# Patient Record
Sex: FEMALE | Race: WHITE | NOT HISPANIC OR LATINO | Employment: OTHER | URBAN - METROPOLITAN AREA
[De-identification: names, ages, dates, MRNs, and addresses within clinical notes are randomized per-mention and may not be internally consistent; named-entity substitution may affect disease eponyms.]

---

## 2017-08-03 ENCOUNTER — HOSPITAL ENCOUNTER (OUTPATIENT)
Dept: CT IMAGING | Facility: CLINIC | Age: 82
Discharge: HOME | End: 2017-08-03
Attending: PREVENTIVE MEDICINE | Admitting: PREVENTIVE MEDICINE
Payer: COMMERCIAL

## 2017-08-03 DIAGNOSIS — Z00.6 RESEARCH EXAM: ICD-10-CM

## 2017-08-03 PROCEDURE — 71250 CT THORAX DX C-: CPT | Mod: TC

## 2017-08-22 ENCOUNTER — TELEPHONE (OUTPATIENT)
Dept: AUDIOLOGY | Facility: CLINIC | Age: 82
End: 2017-08-22

## 2017-08-22 NOTE — TELEPHONE ENCOUNTER
Moira Zaragoza was seen at the [facility] on 8/22/17 for hearing aid services.  Her earmold tubing had broken and needed to be replaced.  This was done today without issue.  Dai reported a comfortable fit from the earmolds with the new tubes in place.  She indicated she may schedule a hearing test soon as she feels the hearing in her left ear may have declined.  Otherwise, she will return to the clinic as needed.

## 2021-02-05 ENCOUNTER — OFFICE VISIT (OUTPATIENT)
Dept: AUDIOLOGY | Facility: CLINIC | Age: 86
End: 2021-02-05

## 2021-02-05 DIAGNOSIS — H90.6 MIXED HEARING LOSS, BILATERAL: Primary | ICD-10-CM

## 2021-02-05 NOTE — PROGRESS NOTES
AUDIOLOGY REPORT    SUBJECTIVE:  Moira Zaragoza is a 85 year old female who was seen in the Audiology Clinic at the [facility] on 2/5/2021 for a hearing aid check. 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  The patient has been seen previously in this clinic and was fit with a binaural Siemens Artis2 SP on 2/4/2008 and a left Siemens Nitro 301 BTE hearing aid on 6/20/12.  Virginia reports she is currently only wearing the right Artis2 SP because she does not see any benefit from the left. She also reports the right hearing aid is broken and is feeding back constantly.    OBJECTIVE:   Otoscopy revealed white debris deep in the right ear canal near the eardrum.    The hearing aid was cleaned and checked. The earmold tubing was extremely brittle and had broken off in the earmold. I changed the earmold tubing and tone hook.    We discussed the debris in her ear and that I think she should be evaluated by an ENT. In addition we discussed that it has been 6 years since her last hearing test so we should update that and should probably start discussing a new hearing aid for her given the age of her current one. She is in agreement with this plan.      ASSESSMENT:   An out of warranty hearing aid clean and check was completed today. Changes to hearing aid was completed as outlined above.     PLAN:   Virginia will return for an ear check with ENT followed by a hearing aid evaluation. Please call this clinic with any questions regarding today s appointment.    Jaamal Landry, [facility]  Licensed Audiologist  [place] License #[id number]

## 2021-02-08 NOTE — TELEPHONE ENCOUNTER
FUTURE VISIT INFORMATION      FUTURE VISIT INFORMATION:    Date: 3/19/21    Time: 8:30 AM    Location: [facility]  REFERRAL INFORMATION:    Referring provider:  Jake Landry    Referring providers clinic:  [facility] - Audiology    Reason for visit/diagnosis: Ear Cleaning    RECORDS REQUESTED FROM:       Clinic name Comments Records Status Imaging Status   [facility] 2/5/21 - AUD OV with Jake Landry  8/4/11 - ENT OV with Dr. Sander Meza    [facility] - Procedure 5/28/15 - Audiogram Epic

## 2021-03-09 NOTE — PATIENT INSTRUCTIONS
1. You were seen in the ENT Clinic today by Dr. Huffman.  If you have any questions or concerns after your appointment, please call   - Option 1: ENT Clinic: [phone]   - Option 2: Laurence (Dr. Huffman's Nurse): [phone]    2.   Plan to return to clinic in 2 weeks with hearing test     3. Tobradex- both ears- 2-3 drops twice daily x 14 days    4. Augmentin- 1 tablet three times daily x 10 days    5. Ear cultures- will call with results and may need to adjust medications.    Laurence Griggs, LPN  [facility] - Otolaryngology    The patient presents with a history of severe sensorineural hearing loss and an acute otitis externa. Her ears are cleaned and cultured today. She will be treated with Augmentin and Tobradex Otic drops for two weeks and she will be seen again in two weeks to assess her progress and to review an Audiogram and Tympanogram.

## 2021-03-19 ENCOUNTER — OFFICE VISIT (OUTPATIENT)
Dept: OTOLARYNGOLOGY | Facility: CLINIC | Age: 86
End: 2021-03-19
Payer: COMMERCIAL

## 2021-03-19 ENCOUNTER — TELEPHONE (OUTPATIENT)
Dept: OTOLARYNGOLOGY | Facility: CLINIC | Age: 86
End: 2021-03-19

## 2021-03-19 ENCOUNTER — PRE VISIT (OUTPATIENT)
Dept: OTOLARYNGOLOGY | Facility: CLINIC | Age: 86
End: 2021-03-19

## 2021-03-19 VITALS
RESPIRATION RATE: 22 BRPM | DIASTOLIC BLOOD PRESSURE: 105 MMHG | OXYGEN SATURATION: 97 % | BODY MASS INDEX: 31.79 KG/M2 | HEART RATE: 109 BPM | WEIGHT: 185.19 LBS | TEMPERATURE: 97.5 F | SYSTOLIC BLOOD PRESSURE: 158 MMHG

## 2021-03-19 DIAGNOSIS — H60.503 ACUTE OTITIS EXTERNA OF BOTH EARS, UNSPECIFIED TYPE: Primary | ICD-10-CM

## 2021-03-19 DIAGNOSIS — H90.3 SENSORINEURAL HEARING LOSS (SNHL) OF BOTH EARS: ICD-10-CM

## 2021-03-19 DIAGNOSIS — H92.13 OTORRHEA, BILATERAL: ICD-10-CM

## 2021-03-19 PROCEDURE — 87102 FUNGUS ISOLATION CULTURE: CPT | Mod: 90 | Performed by: PATHOLOGY

## 2021-03-19 PROCEDURE — 87077 CULTURE AEROBIC IDENTIFY: CPT | Mod: 90 | Performed by: PATHOLOGY

## 2021-03-19 PROCEDURE — 87186 SC STD MICRODIL/AGAR DIL: CPT | Mod: 90 | Performed by: PATHOLOGY

## 2021-03-19 PROCEDURE — 92504 EAR MICROSCOPY EXAMINATION: CPT | Performed by: OTOLARYNGOLOGY

## 2021-03-19 PROCEDURE — 99203 OFFICE O/P NEW LOW 30 MIN: CPT | Mod: 25 | Performed by: OTOLARYNGOLOGY

## 2021-03-19 PROCEDURE — 99000 SPECIMEN HANDLING OFFICE-LAB: CPT | Performed by: PATHOLOGY

## 2021-03-19 PROCEDURE — 87070 CULTURE OTHR SPECIMN AEROBIC: CPT | Mod: 90 | Performed by: PATHOLOGY

## 2021-03-19 PROCEDURE — 87106 FUNGI IDENTIFICATION YEAST: CPT | Mod: 90 | Performed by: PATHOLOGY

## 2021-03-19 RX ORDER — TOBRAMYCIN AND DEXAMETHASONE 3; 1 MG/ML; MG/ML
2-3 SUSPENSION/ DROPS OPHTHALMIC 2 TIMES DAILY
Qty: 5 ML | Refills: 1 | Status: SHIPPED | OUTPATIENT
Start: 2021-03-19 | End: 2021-04-02

## 2021-03-19 RX ORDER — AMOXICILLIN AND CLAVULANATE POTASSIUM 250; 125 MG/1; MG/1
1 TABLET, FILM COATED ORAL 3 TIMES DAILY
Qty: 30 TABLET | Refills: 0 | Status: SHIPPED | OUTPATIENT
Start: 2021-03-19 | End: 2021-03-29

## 2021-03-19 ASSESSMENT — PAIN SCALES - GENERAL: PAINLEVEL: NO PAIN (0)

## 2021-03-19 NOTE — LETTER
3/19/2021        RE: Moira Zaragoza  [address]  [facility] [address]     Dear Colleague,    Thank you for referring your patient, Moira Zaragoza, to the [facility] EAR NOSE AND THROAT CLINIC [place] at [facility]. Please see a copy of my visit note below.    The patient presents with a history of cerumen impaction in the ears. She reports otorrhea from the right ear. She has severe sensorineural hearing loss in both ears. The patient denies sinusitis, rhinitis, facial pain, nasal obstruction or purulent nasal discharge. The patient denies chronic or recurrent tonsillitis, chronic or recurrent pharyngitis. The patient denies dizziness.    This patient is seen in consultation at the request of Dr. Devang Coleman.     All other systems were reviewed and they are either negative or they are not directly pertinent to this Otolaryngology examination.      Past Medical History:    Past Medical History:   Diagnosis Date     Hypertension      Hypothyroid      Pulmonary emboli (H)        Past Surgical History:    No past surgical history on file.    Medications:      Current Outpatient Medications:      aspirin 81 MG tablet, Take 1 tablet by mouth daily., Disp: , Rfl:      fish oil-omega-3 fatty acids (FISH OIL) 1000 MG capsule, Take 1 g by mouth daily., Disp: , Rfl:      fluconazole (DIFLUCAN) 150 MG tablet, Take 1 tablet by mouth every 7 days., Disp: 4 tablet, Rfl: 0     levothyroxine (SYNTHROID, LEVOTHROID) 137 MCG tablet, One daily by mouth, Disp: 90 tablet, Rfl: 3     Multiple Vitamins-Iron (MULTIVITAMIN/IRON PO), Take  by mouth 2 times daily., Disp: , Rfl:      ORDER FOR DME, LOCATION:  Home Med   CPAP:   CPAP 9 cm H2O Flex 3 Lifetime need and humidity      , Disp: 1 Device, Rfl: 0     triamterene-hydrochlorothiazide (MAXZIDE-25) 37.5-25 MG per tablet, One po daily, Disp: 90 tablet, Rfl: 3    Allergies:    Amlodipine, Ocuvite-lutein,  Strawberry, Alendronic acid, Lisinopril, and Risedronate    Physical Examination:    The patient is a well developed, well nourished female in no apparent distress.  She is normocephalic, atraumatic with pupils equally round and reactive to light.    Oral Cavity Examination:  Normal mucosa with no masses or lesions  Nasal Examination: Normal mucosa with no masses or lesions  Ear Examination: Ear canals have a purulent otorrhea in both ears canals with the right ear worse than in the left ear.  The ear canals are suction using a binocular microscope for visualization after a culture is collected from the right ear canal. The tympanic membranes and middle ear spaces normal bilaterally.  Neurological Examination: Facial nerve function intact and symmetric  Integumentary Examination: No lesions on the skin of the head and neck  Neck Examination: No masses or lesions, no lymphadenopathy  Endocrine Examination: Normal thyroid examination    Assessment and Plan:    The patient presents with a history of severe sensorineural hearing loss and an acute otitis externa. Her ears are cleaned and cultured today. She will be treated with Augmentin and Tobradex Otic drops for two weeks and she will be seen again in two weeks to assess her progress and to review an Audiogram and Tympanogram.     CC: Dr. Devang Coleman        Again, thank you for allowing me to participate in the care of your patient.      Sincerely,    Cecil Huffman MD

## 2021-03-19 NOTE — TELEPHONE ENCOUNTER
Verified that the script was sent. They have to order the medication in. They will call the patient.  Laurence Griggs LPN

## 2021-03-19 NOTE — PROGRESS NOTES
The patient presents with a history of cerumen impaction in the ears. She reports otorrhea from the right ear. She has severe sensorineural hearing loss in both ears. The patient denies sinusitis, rhinitis, facial pain, nasal obstruction or purulent nasal discharge. The patient denies chronic or recurrent tonsillitis, chronic or recurrent pharyngitis. The patient denies dizziness.    This patient is seen in consultation at the request of Dr. Devang Coleman.     All other systems were reviewed and they are either negative or they are not directly pertinent to this Otolaryngology examination.      Past Medical History:    Past Medical History:   Diagnosis Date     Hypertension      Hypothyroid      Pulmonary emboli (H)        Past Surgical History:    No past surgical history on file.    Medications:      Current Outpatient Medications:      aspirin 81 MG tablet, Take 1 tablet by mouth daily., Disp: , Rfl:      fish oil-omega-3 fatty acids (FISH OIL) 1000 MG capsule, Take 1 g by mouth daily., Disp: , Rfl:      fluconazole (DIFLUCAN) 150 MG tablet, Take 1 tablet by mouth every 7 days., Disp: 4 tablet, Rfl: 0     levothyroxine (SYNTHROID, LEVOTHROID) 137 MCG tablet, One daily by mouth, Disp: 90 tablet, Rfl: 3     Multiple Vitamins-Iron (MULTIVITAMIN/IRON PO), Take  by mouth 2 times daily., Disp: , Rfl:      ORDER FOR DME, LOCATION: [facility]   CPAP:   CPAP 9 cm H2O Flex 3 Lifetime need and humidity      , Disp: 1 Device, Rfl: 0     triamterene-hydrochlorothiazide (MAXZIDE-25) 37.5-25 MG per tablet, One po daily, Disp: 90 tablet, Rfl: 3    Allergies:    Amlodipine, Ocuvite-lutein, Strawberry, Alendronic acid, Lisinopril, and Risedronate    Physical Examination:    The patient is a well developed, well nourished female in no apparent distress.  She is normocephalic, atraumatic with pupils equally round and reactive to light.    Oral Cavity Examination:  Normal mucosa with no masses or lesions  Nasal Examination: Normal  mucosa with no masses or lesions  Ear Examination: Ear canals have a purulent otorrhea in both ears canals with the right ear worse than in the left ear.  The ear canals are suction using a binocular microscope for visualization after a culture is collected from the right ear canal. The tympanic membranes and middle ear spaces normal bilaterally.  Neurological Examination: Facial nerve function intact and symmetric  Integumentary Examination: No lesions on the skin of the head and neck  Neck Examination: No masses or lesions, no lymphadenopathy  Endocrine Examination: Normal thyroid examination    Assessment and Plan:    The patient presents with a history of severe sensorineural hearing loss and an acute otitis externa. Her ears are cleaned and cultured today. She will be treated with Augmentin and Tobradex Otic drops for two weeks and she will be seen again in two weeks to assess her progress and to review an Audiogram and Tympanogram.     CC: Dr. Devang Coleman

## 2021-03-19 NOTE — NURSING NOTE
Chief Complaint   Patient presents with     Consult     ear cleaning      Blood pressure (!) 158/105, pulse 109, temperature 97.5  F (36.4  C), resp. rate 22, weight 84 kg (185 lb 3 oz), SpO2 97 %.    Delvis Rahman LPN

## 2021-03-19 NOTE — TELEPHONE ENCOUNTER
[facility] Call Center    Phone Message    May a detailed message be left on voicemail: yes     Reason for Call: Medication Question or concern regarding medication   Prescription Clarification  Name of Medication: tobradex, and augmentin  Prescribing Provider: khushbu   Pharmacy: [facility] [address] [place] [address].   What on the order needs clarification? Pt states that she was at her pharmacy a couple hrs ago and they did have not received her rx. Please follow-up with pharmacy ASAP.     Action Taken: Other:  ent    Travel Screening: NA

## 2021-03-24 LAB
BACTERIA SPEC CULT: ABNORMAL
SPECIMEN SOURCE: ABNORMAL

## 2021-03-29 ENCOUNTER — TELEPHONE (OUTPATIENT)
Dept: OTOLARYNGOLOGY | Facility: CLINIC | Age: 86
End: 2021-03-29

## 2021-04-01 ENCOUNTER — TELEPHONE (OUTPATIENT)
Dept: OTOLARYNGOLOGY | Facility: CLINIC | Age: 86
End: 2021-04-01

## 2021-04-02 ENCOUNTER — OFFICE VISIT (OUTPATIENT)
Dept: OTOLARYNGOLOGY | Facility: CLINIC | Age: 86
End: 2021-04-02
Payer: COMMERCIAL

## 2021-04-02 ENCOUNTER — OFFICE VISIT (OUTPATIENT)
Dept: AUDIOLOGY | Facility: CLINIC | Age: 86
End: 2021-04-02
Attending: OTOLARYNGOLOGY
Payer: COMMERCIAL

## 2021-04-02 VITALS
HEIGHT: 64 IN | HEART RATE: 91 BPM | TEMPERATURE: 96.9 F | WEIGHT: 184.3 LBS | SYSTOLIC BLOOD PRESSURE: 155 MMHG | BODY MASS INDEX: 31.47 KG/M2 | DIASTOLIC BLOOD PRESSURE: 79 MMHG | OXYGEN SATURATION: 96 %

## 2021-04-02 DIAGNOSIS — H60.503 ACUTE OTITIS EXTERNA OF BOTH EARS, UNSPECIFIED TYPE: Primary | ICD-10-CM

## 2021-04-02 DIAGNOSIS — H90.6 MIXED HEARING LOSS, BILATERAL: Primary | ICD-10-CM

## 2021-04-02 DIAGNOSIS — H92.13 OTORRHEA, BILATERAL: ICD-10-CM

## 2021-04-02 DIAGNOSIS — H90.3 SENSORINEURAL HEARING LOSS (SNHL) OF BOTH EARS: ICD-10-CM

## 2021-04-02 PROCEDURE — 92557 COMPREHENSIVE HEARING TEST: CPT | Performed by: AUDIOLOGIST

## 2021-04-02 PROCEDURE — 92550 TYMPANOMETRY & REFLEX THRESH: CPT | Mod: 52 | Performed by: AUDIOLOGIST

## 2021-04-02 PROCEDURE — 99213 OFFICE O/P EST LOW 20 MIN: CPT | Performed by: OTOLARYNGOLOGY

## 2021-04-02 RX ORDER — LOSARTAN POTASSIUM 25 MG/1
TABLET ORAL
COMMUNITY
Start: 2021-03-22

## 2021-04-02 RX ORDER — TOBRAMYCIN AND DEXAMETHASONE 3; 1 MG/ML; MG/ML
2-3 SUSPENSION/ DROPS OPHTHALMIC 2 TIMES DAILY
Qty: 5 ML | Refills: 1 | Status: SHIPPED | OUTPATIENT
Start: 2021-04-02 | End: 2021-05-06

## 2021-04-02 RX ORDER — AMOXICILLIN AND CLAVULANATE POTASSIUM 250; 125 MG/1; MG/1
1 TABLET, FILM COATED ORAL
COMMUNITY
Start: 2021-03-22 | End: 2021-05-06

## 2021-04-02 ASSESSMENT — MIFFLIN-ST. JEOR: SCORE: 1261

## 2021-04-02 ASSESSMENT — PAIN SCALES - GENERAL: PAINLEVEL: NO PAIN (0)

## 2021-04-02 NOTE — PATIENT INSTRUCTIONS
1. You were seen in the ENT Clinic today by Dr. Huffman.  If you have any questions or concerns after your appointment, please call   - Option 1: ENT Clinic: [phone]  - Option 2: Brinda (Dr. Huffman's Nurse): [phone]    2.   CT scan of temporal bone.    3.  Refill tobradex.    4.  Plan to return to clinic after imaging.     The patient presents with a history of severe sensorineural hearing loss and an acute otitis externa. She was treated with Augmentin and Tobradex Otic and she responded to this therapy. She will proceed with her Audiogram and Tympanogram and she will be seen again as needed. She will also be referred for a CT scan of the temporal bones to assess her middle ear and mastoid. She will be seen again after this is completed.     Brinda Khan RN  Clinical Coordinator  [facility] Otolaryngology  [phone]

## 2021-04-02 NOTE — NURSING NOTE
"Chief Complaint   Patient presents with     RECHECK     2 week follow up and WIN after       Blood pressure (!) 155/79, pulse 91, temperature 96.9  F (36.1  C), temperature source Temporal, height 1.626 m (5' 4\"), weight 83.6 kg (184 lb 4.9 oz), SpO2 96 %.    Malika Schofield, EMT    "

## 2021-04-02 NOTE — PROGRESS NOTES
The patient presents with a history of otitis externa. She was treated with Augmentin and Tobradex Otic. She reports improvement of her infection with this therapy. She denies otalgia or otorrhea. She has severe sensorineural hearing loss in both ears.          All other systems were reviewed and they are either negative or they are not directly pertinent to this Otolaryngology examination.      Past Medical History:    Past Medical History:   Diagnosis Date     Hypertension      Hypothyroid      Pulmonary emboli (H)        Past Surgical History:    No past surgical history on file.    Medications:      Current Outpatient Medications:      Docosahexaenoic Acid (DHA) 200 MG capsule, Take 200 mg by mouth daily, Disp: , Rfl:      levothyroxine (SYNTHROID, LEVOTHROID) 137 MCG tablet, One daily by mouth, Disp: 90 tablet, Rfl: 3     amoxicillin-clavulanate (AUGMENTIN) 250-125 MG tablet, Take 1 tablet by mouth, Disp: , Rfl:      aspirin 81 MG tablet, Take 1 tablet by mouth daily., Disp: , Rfl:      fish oil-omega-3 fatty acids (FISH OIL) 1000 MG capsule, Take 1 g by mouth daily., Disp: , Rfl:      fluconazole (DIFLUCAN) 150 MG tablet, Take 1 tablet by mouth every 7 days., Disp: 4 tablet, Rfl: 0     Multiple Vitamins-Iron (MULTIVITAMIN/IRON PO), Take  by mouth 2 times daily., Disp: , Rfl:      ORDER FOR DME, LOCATION: [facility]   CPAP:   CPAP 9 cm H2O Flex 3 Lifetime need and humidity      , Disp: 1 Device, Rfl: 0     tobramycin-dexamethasone (TOBRADEX) 0.3-0.1 % ophthalmic suspension, Place 2-3 drops into both ears 2 times daily for 14 days, Disp: 5 mL, Rfl: 1     triamterene-hydrochlorothiazide (MAXZIDE-25) 37.5-25 MG per tablet, One po daily, Disp: 90 tablet, Rfl: 3    Allergies:    Amlodipine, Ocuvite-lutein, Strawberry, Alendronic acid, Lisinopril, and Risedronate    Physical Examination:    The patient is a well developed, well nourished female in no apparent distress.  She is normocephalic, atraumatic with pupils  equally round and reactive to light.    Oral Cavity Examination:  Normal mucosa with no masses or lesions  Nasal Examination: Normal mucosa with no masses or lesions  Ear Examination: Ear canals are clear. The tympanic membrane and middle ear space on the left ear normal. On the right, he tympanic membrane is somewhat opaque and it is difficulty to assess the middle ear space.  Neurological Examination: Facial nerve function intact and symmetric  Integumentary Examination: No lesions on the skin of the head and neck    Assessment and Plan:    The patient presents with a history of severe sensorineural hearing loss and an acute otitis externa. She was treated with Augmentin and Tobradex Otic and she responded to this therapy. She will proceed with her Audiogram and Tympanogram and she will be seen again as needed. She will also be referred for a CT scan of the temporal bones to assess her middle ear and mastoid. She will be seen again after this is completed.     CC: Dr. Devang Coleman

## 2021-04-02 NOTE — LETTER
4/2/2021       RE: Moira Zaragoza  [address]  [facility] [address]     Dear Colleague,    Thank you for referring your patient, Moira Zaragoza, to the [facility] EAR NOSE AND THROAT CLINIC [place] at [facility]. Please see a copy of my visit note below.    The patient presents with a history of otitis externa. She was treated with Augmentin and Tobradex Otic. She reports improvement of her infection with this therapy. She denies otalgia or otorrhea. She has severe sensorineural hearing loss in both ears.          All other systems were reviewed and they are either negative or they are not directly pertinent to this Otolaryngology examination.      Past Medical History:    Past Medical History:   Diagnosis Date     Hypertension      Hypothyroid      Pulmonary emboli (H)        Past Surgical History:    No past surgical history on file.    Medications:      Current Outpatient Medications:      Docosahexaenoic Acid (DHA) 200 MG capsule, Take 200 mg by mouth daily, Disp: , Rfl:      levothyroxine (SYNTHROID, LEVOTHROID) 137 MCG tablet, One daily by mouth, Disp: 90 tablet, Rfl: 3     amoxicillin-clavulanate (AUGMENTIN) 250-125 MG tablet, Take 1 tablet by mouth, Disp: , Rfl:      aspirin 81 MG tablet, Take 1 tablet by mouth daily., Disp: , Rfl:      fish oil-omega-3 fatty acids (FISH OIL) 1000 MG capsule, Take 1 g by mouth daily., Disp: , Rfl:      fluconazole (DIFLUCAN) 150 MG tablet, Take 1 tablet by mouth every 7 days., Disp: 4 tablet, Rfl: 0     Multiple Vitamins-Iron (MULTIVITAMIN/IRON PO), Take  by mouth 2 times daily., Disp: , Rfl:      ORDER FOR DME, LOCATION:  Home Med   CPAP:   CPAP 9 cm H2O Flex 3 Lifetime need and humidity      , Disp: 1 Device, Rfl: 0     tobramycin-dexamethasone (TOBRADEX) 0.3-0.1 % ophthalmic suspension, Place 2-3 drops into both ears 2 times daily for 14 days, Disp: 5 mL, Rfl: 1     triamterene-hydrochlorothiazide  (MAXZIDE-25) 37.5-25 MG per tablet, One po daily, Disp: 90 tablet, Rfl: 3    Allergies:    Amlodipine, Ocuvite-lutein, Strawberry, Alendronic acid, Lisinopril, and Risedronate    Physical Examination:    The patient is a well developed, well nourished female in no apparent distress.  She is normocephalic, atraumatic with pupils equally round and reactive to light.    Oral Cavity Examination:  Normal mucosa with no masses or lesions  Nasal Examination: Normal mucosa with no masses or lesions  Ear Examination: Ear canals are clear. The tympanic membrane and middle ear space on the left ear normal. On the right, he tympanic membrane is somewhat opaque and it is difficulty to assess the middle ear space.  Neurological Examination: Facial nerve function intact and symmetric  Integumentary Examination: No lesions on the skin of the head and neck    Assessment and Plan:    The patient presents with a history of severe sensorineural hearing loss and an acute otitis externa. She was treated with Augmentin and Tobradex Otic and she responded to this therapy. She will proceed with her Audiogram and Tympanogram and she will be seen again as needed. She will also be referred for a CT scan of the temporal bones to assess her middle ear and mastoid. She will be seen again after this is completed.     CC: Dr. Devang Coleman        Again, thank you for allowing me to participate in the care of your patient.      Sincerely,    Cecil Huffman MD

## 2021-04-02 NOTE — PROGRESS NOTES
AUDIOLOGY REPORT    SUMMARY: Audiology visit completed. See audiogram for results.      RECOMMENDATIONS: Follow-up with ENT.    Jamaal Landry, [facility]  Licensed Audiologist  [place] License #[id number]

## 2021-04-07 ENCOUNTER — ANCILLARY PROCEDURE (OUTPATIENT)
Dept: CT IMAGING | Facility: CLINIC | Age: 86
End: 2021-04-07
Attending: OTOLARYNGOLOGY
Payer: COMMERCIAL

## 2021-04-07 DIAGNOSIS — H90.3 SENSORINEURAL HEARING LOSS (SNHL) OF BOTH EARS: ICD-10-CM

## 2021-04-07 DIAGNOSIS — H60.503 ACUTE OTITIS EXTERNA OF BOTH EARS, UNSPECIFIED TYPE: ICD-10-CM

## 2021-04-07 PROCEDURE — 70480 CT ORBIT/EAR/FOSSA W/O DYE: CPT | Performed by: RADIOLOGY

## 2021-04-16 LAB
FUNGUS SPEC CULT: ABNORMAL
FUNGUS SPEC CULT: ABNORMAL
SPECIMEN SOURCE: ABNORMAL

## 2021-04-28 NOTE — PATIENT INSTRUCTIONS
1. You were seen in the ENT Clinic today by Dr. Huffman.  If you have any questions or concerns after your appointment, please call   - Option 1: ENT Clinic: [phone]   - Option 2: Laurence (Dr. Huffman's Nurse): [phone]    2.   Plan to return to clinic in 3 months    Laurence Griggs LPN  [facility] - Otolaryngology      The patient presents with a history of severe sensorineural hearing loss and an acute otitis externa. She was treated with Augmentin and Tobradex Otic and she responded to this therapy. Based upon her CT scan and her Audiogram and Tympanogram, she was offered a consultation with our surgical neurotologists, but she declines. She will continue efforts of hearing amplification and she will be seen again in three months.

## 2021-05-06 ENCOUNTER — OFFICE VISIT (OUTPATIENT)
Dept: OTOLARYNGOLOGY | Facility: CLINIC | Age: 86
End: 2021-05-06
Payer: COMMERCIAL

## 2021-05-06 VITALS
TEMPERATURE: 97.6 F | HEART RATE: 101 BPM | WEIGHT: 187.39 LBS | BODY MASS INDEX: 31.99 KG/M2 | OXYGEN SATURATION: 98 % | HEIGHT: 64 IN

## 2021-05-06 DIAGNOSIS — H90.6 MIXED CONDUCTIVE AND SENSORINEURAL HEARING LOSS OF BOTH EARS: Primary | ICD-10-CM

## 2021-05-06 PROCEDURE — 99213 OFFICE O/P EST LOW 20 MIN: CPT | Performed by: OTOLARYNGOLOGY

## 2021-05-06 ASSESSMENT — MIFFLIN-ST. JEOR: SCORE: 1275

## 2021-05-06 ASSESSMENT — PAIN SCALES - GENERAL: PAINLEVEL: NO PAIN (0)

## 2021-05-06 NOTE — LETTER
5/6/2021       RE: Moira Zaragoza  [address]  [facility] [address]     Dear Colleague,    Thank you for referring your patient, Moira Zaragoza, to the [facility] EAR NOSE AND THROAT CLINIC [place] at [facility]. Please see a copy of my visit note below.    The patient presents with a history of otitis externa. She was treated with Augmentin and Tobradex Otic. She reports improvement of her infection with this therapy. She denies otalgia or otorrhea. She has severe sensorineural hearing loss in both ears.    Her recent CT scan demonstrates:     Impression:    Right temporal bone: Partial opacification of the mastoid air cells  and thickening of the tympanic membrane.  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 Thickening of the tympanic  membrane.       All other systems were reviewed and they are either negative or they are not directly pertinent to this Otolaryngology examination.      Past Medical History:    Past Medical History:   Diagnosis Date     Hypertension      Hypothyroid      Pulmonary emboli (H)        Past Surgical History:    No past surgical history on file.    Medications:      Current Outpatient Medications:      aspirin 81 MG tablet, Take 1 tablet by mouth daily., Disp: , Rfl:      Docosahexaenoic Acid (DHA) 200 MG capsule, Take 200 mg by mouth daily, Disp: , Rfl:      fish oil-omega-3 fatty acids (FISH OIL) 1000 MG capsule, Take 1 g by mouth daily., Disp: , Rfl:      levothyroxine (SYNTHROID, LEVOTHROID) 137 MCG tablet, One daily by mouth, Disp: 90 tablet, Rfl: 3     losartan (COZAAR) 25 MG tablet, , Disp: , Rfl:      Multiple Vitamins-Iron (MULTIVITAMIN/IRON PO), Take  by mouth 2 times daily., Disp: , Rfl:      ORDER FOR DME, LOCATION:  Home Med   CPAP:   CPAP 9 cm H2O Flex 3 Lifetime need  and humidity      , Disp: 1 Device, Rfl: 0     triamterene-hydrochlorothiazide (MAXZIDE-25) 37.5-25 MG per tablet, One po daily (Patient not taking: Reported on 5/6/2021), Disp: 90 tablet, Rfl: 3    Allergies:    Amlodipine, Ocuvite-lutein, Strawberry, Alendronic acid, Lisinopril, and Risedronate    Physical Examination:    The patient is a well developed, well nourished female in no apparent distress.  She is normocephalic, atraumatic with pupils equally round and reactive to light.    Oral Cavity Examination:  Normal mucosa with no masses or lesions  Nasal Examination: Normal mucosa with no masses or lesions  Ear Examination: Ear canals are clear. The tympanic membrane and middle ear space on the left ear normal. On the right, he tympanic membrane is somewhat opaque and it is difficulty to assess the middle ear space.  Neurological Examination: Facial nerve function intact and symmetric  Integumentary Examination: No lesions on the skin of the head and neck    Assessment and Plan:    The patient presents with a history of severe sensorineural hearing loss and an acute otitis externa. She was treated with Augmentin and Tobradex Otic and she responded to this therapy. Based upon her CT scan and her Audiogram and Tympanogram, she was offered a consultation with our surgical neurotologists, but she declines. She will continue efforts of hearing amplification and she will be seen again in three months.     CC: Dr. Devang Coleman        Again, thank you for allowing me to participate in the care of your patient.      Sincerely,    Cecil Huffman MD

## 2021-05-06 NOTE — PROGRESS NOTES
The patient presents with a history of otitis externa. She was treated with Augmentin and Tobradex Otic. She reports improvement of her infection with this therapy. She denies otalgia or otorrhea. She has severe sensorineural hearing loss in both ears.    Her recent CT scan demonstrates:     Impression:    Right temporal bone: Partial opacification of the mastoid air cells  and thickening of the tympanic membrane.  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 Thickening of the tympanic  membrane.       All other systems were reviewed and they are either negative or they are not directly pertinent to this Otolaryngology examination.      Past Medical History:    Past Medical History:   Diagnosis Date     Hypertension      Hypothyroid      Pulmonary emboli (H)        Past Surgical History:    No past surgical history on file.    Medications:      Current Outpatient Medications:      aspirin 81 MG tablet, Take 1 tablet by mouth daily., Disp: , Rfl:      Docosahexaenoic Acid (DHA) 200 MG capsule, Take 200 mg by mouth daily, Disp: , Rfl:      fish oil-omega-3 fatty acids (FISH OIL) 1000 MG capsule, Take 1 g by mouth daily., Disp: , Rfl:      levothyroxine (SYNTHROID, LEVOTHROID) 137 MCG tablet, One daily by mouth, Disp: 90 tablet, Rfl: 3     losartan (COZAAR) 25 MG tablet, , Disp: , Rfl:      Multiple Vitamins-Iron (MULTIVITAMIN/IRON PO), Take  by mouth 2 times daily., Disp: , Rfl:      ORDER FOR DME, LOCATION:  Home [facility]   CPAP:   CPAP 9 cm H2O Flex 3 Lifetime need and humidity      , Disp: 1 Device, Rfl: 0     triamterene-hydrochlorothiazide (MAXZIDE-25) 37.5-25 MG per tablet, One po daily (Patient not taking: Reported on 5/6/2021), Disp: 90 tablet, Rfl: 3    Allergies:    Amlodipine, Ocuvite-lutein, Strawberry, Alendronic acid, Lisinopril, and Risedronate    Physical Examination:    The  patient is a well developed, well nourished female in no apparent distress.  She is normocephalic, atraumatic with pupils equally round and reactive to light.    Oral Cavity Examination:  Normal mucosa with no masses or lesions  Nasal Examination: Normal mucosa with no masses or lesions  Ear Examination: Ear canals are clear. The tympanic membrane and middle ear space on the left ear normal. On the right, he tympanic membrane is somewhat opaque and it is difficulty to assess the middle ear space.  Neurological Examination: Facial nerve function intact and symmetric  Integumentary Examination: No lesions on the skin of the head and neck    Assessment and Plan:    The patient presents with a history of severe sensorineural hearing loss and an acute otitis externa. She was treated with Augmentin and Tobradex Otic and she responded to this therapy. Based upon her CT scan and her Audiogram and Tympanogram, she was offered a consultation with our surgical neurotologists, but she declines. She will continue efforts of hearing amplification and she will be seen again in three months.     CC: Dr. Devang Coleman

## 2021-05-06 NOTE — LETTER
5/6/2021      RE: Moira Zaragoza  [address]  [facility] [address]       The patient presents with a history of otitis externa. She was treated with Augmentin and Tobradex Otic. She reports improvement of her infection with this therapy. She denies otalgia or otorrhea. She has severe sensorineural hearing loss in both ears.    Her recent CT scan demonstrates:     Impression:    Right temporal bone: Partial opacification of the mastoid air cells  and thickening of the tympanic membrane.  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 Thickening of the tympanic  membrane.       All other systems were reviewed and they are either negative or they are not directly pertinent to this Otolaryngology examination.      Past Medical History:    Past Medical History:   Diagnosis Date     Hypertension      Hypothyroid      Pulmonary emboli (H)        Past Surgical History:    No past surgical history on file.    Medications:      Current Outpatient Medications:      aspirin 81 MG tablet, Take 1 tablet by mouth daily., Disp: , Rfl:      Docosahexaenoic Acid (DHA) 200 MG capsule, Take 200 mg by mouth daily, Disp: , Rfl:      fish oil-omega-3 fatty acids (FISH OIL) 1000 MG capsule, Take 1 g by mouth daily., Disp: , Rfl:      levothyroxine (SYNTHROID, LEVOTHROID) 137 MCG tablet, One daily by mouth, Disp: 90 tablet, Rfl: 3     losartan (COZAAR) 25 MG tablet, , Disp: , Rfl:      Multiple Vitamins-Iron (MULTIVITAMIN/IRON PO), Take  by mouth 2 times daily., Disp: , Rfl:      ORDER FOR DME, LOCATION:  Home Med   CPAP:   CPAP 9 cm H2O Flex 3 Lifetime need and humidity      , Disp: 1 Device, Rfl: 0     triamterene-hydrochlorothiazide (MAXZIDE-25) 37.5-25 MG per tablet, One po daily (Patient not taking: Reported on 5/6/2021), Disp: 90 tablet, Rfl: 3    Allergies:    Amlodipine, Ocuvite-lutein,  Strawberry, Alendronic acid, Lisinopril, and Risedronate    Physical Examination:    The patient is a well developed, well nourished female in no apparent distress.  She is normocephalic, atraumatic with pupils equally round and reactive to light.    Oral Cavity Examination:  Normal mucosa with no masses or lesions  Nasal Examination: Normal mucosa with no masses or lesions  Ear Examination: Ear canals are clear. The tympanic membrane and middle ear space on the left ear normal. On the right, he tympanic membrane is somewhat opaque and it is difficulty to assess the middle ear space.  Neurological Examination: Facial nerve function intact and symmetric  Integumentary Examination: No lesions on the skin of the head and neck    Assessment and Plan:    The patient presents with a history of severe sensorineural hearing loss and an acute otitis externa. She was treated with Augmentin and Tobradex Otic and she responded to this therapy. Based upon her CT scan and her Audiogram and Tympanogram, she was offered a consultation with our surgical neurotologists, but she declines. She will continue efforts of hearing amplification and she will be seen again in three months.     CC: Dr. Devang Huffman MD

## 2021-05-06 NOTE — NURSING NOTE
"Chief Complaint   Patient presents with     RECHECK     follow up      Pulse 101, temperature 97.6  F (36.4  C), height 1.626 m (5' 4\"), weight 85 kg (187 lb 6.3 oz), SpO2 98 %.    Delvis Rahman LPN    "

## 2021-05-22 ENCOUNTER — HEALTH MAINTENANCE LETTER (OUTPATIENT)
Age: 86
End: 2021-05-22

## 2021-05-24 ENCOUNTER — RECORDS - HEALTHEAST (OUTPATIENT)
Dept: ADMINISTRATIVE | Facility: CLINIC | Age: 86
End: 2021-05-24

## 2021-05-26 ENCOUNTER — OFFICE VISIT (OUTPATIENT)
Dept: AUDIOLOGY | Facility: CLINIC | Age: 86
End: 2021-05-26
Payer: COMMERCIAL

## 2021-05-26 DIAGNOSIS — H90.3 SENSORINEURAL HEARING LOSS (SNHL) OF BOTH EARS: Primary | ICD-10-CM

## 2021-05-26 DIAGNOSIS — H90.6 MIXED HEARING LOSS, BILATERAL: ICD-10-CM

## 2021-05-26 PROCEDURE — 92590 PR HEARING AID EXAM MONAURAL: CPT | Mod: RT | Performed by: AUDIOLOGIST

## 2021-05-26 NOTE — PROGRESS NOTES
"AUDIOLOGY REPORT    SUBJECTIVE: Moira Zaragoza is a 86 year old female was seen in the Audiology Clinic at  [facility] and Surgery [facility] on 5/26/21 to discuss concerns with hearing and functional communication difficulties.  [place]ia has been seen previously on 4/2/21, and results revealed a bilateral mixed hearing loss. Virginia notes difficulty with communication in a variety of listening situations. The patient has been seen previously in this clinic and was fit with a binaural Siemens Artis2 SP on 2/4/2008.  She stopped wearing the left hearing aid due to perceived lack of benefit.      She signed an ABN prior to the appointment.  She has a TruHearing benefit but wanted to proceed with the consultation today.    OBJECTIVE:  Patient is a hearing aid candidate. Patient would like to move forward with a hearing aid evaluation today. Therefore, the patient was presented with different options for amplification to help aid in communication. Discussed styles, levels of technology and monaural vs. binaural fitting.     She reports that she is only interested in a right hearing aid. She is generally in one on one situations, some small groups. She notes she is outside frequently. She has an \"really old\" iPhone and is not interested in connectivity. She likes her current BTE style and would like to stay with disposable batteries.    The hearing aid(s) mutually chosen were:  Right: Signia Motion Pure 3X  COLOR: Dark Champagne, 37  BATTERY SIZE: 675  EARMOLD/TIPS: full shell    Otoscopy revealed drainage down in the ear canal but it was past where the earmold block would be placed. A right earmold was taken without incident.    ASSESSMENT:     Reviewed purchase information and warranty information with patient. The 45 day trial period was explained to patient. The patient was given a copy of the [facility] consumer brochure on purchasing hearing instruments. Patient risk " factors have been provided to the patient in writing prior to the sale of the hearing aid per FDA regulation. The risk factors are also available in the User Instructional Booklet to be presented on the day of the hearing aid fitting. Hearing aids ordered. Hearing aid evaluation completed.    PLAN: Virginia is scheduled to return in 2-3 weeks for a hearing aid fitting and programming. Purchase agreement will be completed on that date. Please contact this clinic with any questions or concerns.      Greg Pena  Audiologist  [place] License  #[id number]

## 2021-06-21 ENCOUNTER — OFFICE VISIT (OUTPATIENT)
Dept: AUDIOLOGY | Facility: CLINIC | Age: 86
End: 2021-06-21

## 2021-06-21 ENCOUNTER — TELEPHONE (OUTPATIENT)
Dept: AUDIOLOGY | Facility: CLINIC | Age: 86
End: 2021-06-21

## 2021-06-21 DIAGNOSIS — H90.6 MIXED HEARING LOSS, BILATERAL: Primary | ICD-10-CM

## 2021-06-21 PROCEDURE — 99207 PR ASSESSMENT FOR HEARING AID: CPT | Performed by: AUDIOLOGIST

## 2021-06-21 NOTE — PROGRESS NOTES
"AUDIOLOGY REPORT    SUBJECTIVE: Moira Zaragoza is a 86 year old female who was seen in the Audiology Clinic at the [facility] and Surgery [facility] for a fitting of a right Signia Motion Pure 3X hearing aid. 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 The patient is a longstanding user of hearing aids.     OBJECTIVE: The hearing aid conformity evaluation was completed.The hearing aids were placed and they provided a good fit. Real-ear-probe-microphone measurements were completed on the [employer] system and were a good match to NAL-NL1 target with soft sounds audible, moderate sounds comfortable, and loud sounds below discomfort. UCLs are verified through maximum power output measures and demonstrate appropriate limiting of loud inputs  Virginia reported that she \"could not hear anything\" with the hearing aids programmed to target gain. Real-ear measurements were performed with her previous hearing aid and it was found to be above target gain. The new hearing aid gain could not be increased as much as she would like due to limits of equipment. She reported that she did not feel safe with the new hearing aid because it was \"too quiet\".  A different hearing aid will be ordered for her to try, the Phonak Mary device was discussed and she was open to trying it.  She did not leave with the new hearing aid today.     ASSESSMENT: An attempt was made to fit a new hearing aid but it was not loud enough for the patient's preference. A different, more powerful hearing aid will be ordered.     PLAN:Virginia will return next week to be fit with the new hearing aid. She was given the option of 6/28 or 7/1 at 11:30am, she will contact the clinic with which date she would like after she finds a ride.  Today's appointment is a no charge visit as she did not leave with the hearing aid.  Please call this clinic with questions regarding today s appointment.    Jemma Pendleton, " AuD  Audiologist  [place] License  #[id number]

## 2021-06-21 NOTE — TELEPHONE ENCOUNTER
[facility] Call Center    Phone Message    May a detailed message be left on voicemail: yes     Reason for Call: Other: Pt's daughter calling on behalf of Pt to confirm that 6/28 appointment at 11:30 as offered in last visit notes will work for them. Please follow up with Pt for further questions.      Action Taken: Message routed to:  [facility] ([facility] Audiology    Travel Screening: Not Applicable

## 2021-06-28 ENCOUNTER — OFFICE VISIT (OUTPATIENT)
Dept: AUDIOLOGY | Facility: CLINIC | Age: 86
End: 2021-06-28

## 2021-06-28 DIAGNOSIS — H90.6 MIXED HEARING LOSS, BILATERAL: Primary | ICD-10-CM

## 2021-06-28 PROCEDURE — V5011 HEARING AID FITTING/CHECKING: HCPCS | Performed by: AUDIOLOGIST

## 2021-06-28 PROCEDURE — V5241 DISPENSING FEE, MONAURAL: HCPCS | Performed by: AUDIOLOGIST

## 2021-06-28 PROCEDURE — V5257 HEARING AID, DIGIT, MON, BTE: HCPCS | Performed by: AUDIOLOGIST

## 2021-06-28 PROCEDURE — V5264 EAR MOLD/INSERT: HCPCS | Performed by: AUDIOLOGIST

## 2021-06-28 PROCEDURE — V5020 CONFORMITY EVALUATION: HCPCS | Performed by: AUDIOLOGIST

## 2021-06-28 NOTE — PROGRESS NOTES
"AUDIOLOGY REPORT    SUBJECTIVE: Moira Zaragoza is a 86 year old female who was seen in the Audiology Clinic at the [facility] and Surgery [facility] for a fitting of a right Phonak Mary P50-UP hearing aid.Previous results have revealed a bilateral mixed hearing loss. The patient is a longstanding user of hearing aids.   She was seen last week in an attempt to fit an Signia Motion hearing aid, however the patient reported that it was \"too soft\" and that she \"could not hear anything\" from it. The hearing aid was near equipment limits and so it was decided to stop the fitting of the Signia hearing aid and try a more powerful device.  Patient signed the Self-pay waiver as she has a TruHearing benefit but has decided to continue with hearing aids at this clinic which is not a TruHearing provider.    OBJECTIVE: The hearing aid conformity evaluation was completed.The hearing aids were placed and they provided a good fit. Real-ear-probe-microphone measurements were completed on the [employer] system and were a good match to NAL-NL1 target with soft sounds audible, moderate sounds comfortable, and loud sounds below discomfort. UCLs are verified through maximum power output measures and demonstrate appropriate limiting of loud inputs. Virginia was oriented to proper hearing aid use, care, cleaning (no water, dry brush), batteries (size: BATTERY SIZE: 675, insertion/removal, toxicity, low-battery signal), aid insertion/removal, user booklet, warranty information, storage cases, and other hearing aid details. The patient confirmed understanding of hearing aid use and care, and showed proper insertion of hearing aid and batteries while in the office today.Virginia reported good volume and sound quality today.   Hearing aids were programmed as follows:  Program 1:AutoSense  Volume control is activated  EAR(S) FIT: Right  HEARING AID MODEL NAME: Phonak Mary P50-UP  HEARING AID STYLE: " "Behind-the-ear  EARMOLDS/TIP: full shell  SERIAL NUMBERS: Right: [id number]  WARRANTY END DATE: 9/20/2023  The hearing aid was paired with her iPhone to try with phone calls. A call was performed in office and she expressed that she would prefer to keep it simple and not stream so the hearing aid was unpaired from the phone.    Patient was counseled regarding using a mask and wearing hearing aids. It was discussed that the patient should be careful when removing the mask and always check the ears to be sure the hearing aids are still in place following mask removal.  It is recommended that the patient strongly consider the use of an \"ear saver\" which anchors the mask behind the neck if using a mask that typically goes behind the ears, or use a mask that ties behind the head. The patient expressed understanding.    ASSESSMENT: A right hearing aid was fit today. Verification measures were performed. Virginia signed the Hearing Aid Purchase Agreement and was given a copy, as well as details on her hearing aids. Patient was counseled that exact out of pocket amounts cannot be determined for hearing aid claims being sent to insurance. Any insurance coverage information presented to the patient is an estimate only, and is not a guarantee of payment. Patient has been advised to check with their own insurance.    PLAN:Virginia expressed that she will be leaving the [facility] next week to go to [place] for a few months. She expressed understanding that a initial review appointment is strongly encourage, but because she is an experienced hearing aid user, she does not think she will need one. The next appointment was cancelled per her request, she expressed understanding that if she does not like the hearing aid and decides to return it, she would need to return it to the clinic by 8/12/2021, this was written down on her purchase agreement. Please call this clinic with questions regarding today s appointment.    Jemma " Greg Pendleton  Audiologist  [place] License  #[id number]

## 2021-08-13 ENCOUNTER — OFFICE VISIT (OUTPATIENT)
Dept: AUDIOLOGY | Facility: CLINIC | Age: 86
End: 2021-08-13
Payer: COMMERCIAL

## 2021-08-13 DIAGNOSIS — H90.6 MIXED HEARING LOSS, BILATERAL: Primary | ICD-10-CM

## 2021-08-13 PROCEDURE — 99207 PR ASSESSMENT FOR HEARING AID: CPT | Performed by: AUDIOLOGIST

## 2021-08-13 NOTE — PROGRESS NOTES
"AUDIOLOGY REPORT    SUBJECTIVE:Moira Zaragoza is a 86 year old female who was seen in the Audiology Clinic at the [facility] on 8/13/2021  for a follow-up check regarding the fitting of new hearing aids. Virginia has been seen previously in this clinic and was fit with right Phonak Mary P50-UP hearing aid.Previous results have revealed a bilateral mixed hearing loss. The patient is a longstanding user of hearing aids. Virginia reports that she feels sounds are loud enough, however they are not as \"clear\" as her last hearing aids. She also feels she is getting a slight echo to the sound.    OBJECTIVE:   Based on patient report, the following changes were made;Tried turning off Sound Recover and patient report she thinks sound is clearer. Reviewed in detail several times how to change the volume of the hearing aids. Discussed that I want her to try the changes made, and use of the volume control before we make any other changes today. .    Reviewed 45 day trial period, care, cleaning (no water, dry brush), batteries (size 675) insertion/removal, toxicity, low-battery signal), aid insertion/removal, volume adjustment (if applicable), user booklet, warranty information, storage cases, and other hearing aid details. Patient is clearly asked if she wants to keep hearing aid today as we are near the end of the trial period,and she confirmed she did want to keep the hearing aid today.    No charge visit today (in warranty hearing aid check).    ASSESSMENT: A follow-up appointment for hearing aid fitting was completed today. IOI-HA should be administered at next appointment. Changes to hearing aid was completed as outlined above.     PLAN:Virginia will return for follow-up in 2-4 weeks to see if the changes made are beneficial. Please call this clinic with any questions regarding today s appointment.        Aung Sher, [facility]  Licensed Audiologist  [place] #[id number]        "

## 2021-09-11 ENCOUNTER — HEALTH MAINTENANCE LETTER (OUTPATIENT)
Age: 86
End: 2021-09-11

## 2021-09-22 ENCOUNTER — OFFICE VISIT (OUTPATIENT)
Dept: AUDIOLOGY | Facility: CLINIC | Age: 86
End: 2021-09-22
Payer: COMMERCIAL

## 2021-09-22 DIAGNOSIS — H90.6 MIXED HEARING LOSS, BILATERAL: Primary | ICD-10-CM

## 2021-09-22 PROCEDURE — 99207 PR ASSESSMENT FOR HEARING AID: CPT | Performed by: AUDIOLOGIST

## 2021-09-22 NOTE — PROGRESS NOTES
AUDIOLOGY REPORT    SUBJECTIVE:Moira Zaragoza is a 86 year old female who was seen in the Audiology Clinic at the [facility] on 9/22/2021  for a follow-up check regarding the fitting of new hearing aids. 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 The patient has been seen previously in this clinic and was fit with a right Phonak Mary P50-UP on 6/28/2021.  Virginia reports she is having trouble understanding speech. She feels speech is still unclear and finds herself turning her hearing aids up to the maximum volume daily.     OBJECTIVE:   Based on patient report, the following changes were made; Sound recover was turned back on starting at 4 kHz and she reported no echo with it activated again. Real ear measures were performed and the gain was increased by ~6 dB from 2-3 kHz to better match target gain and gain was decreased 6 dB from 250-500 Hz (still over target gain which is consistent with her previous hearing aid). Virginia reported good sound quality. Volume changes using the buttons was reviewed in detail with [place].     Reviewed 45 day trial period, care, cleaning (no water, dry brush), batteries (size 675) insertion/removal, toxicity, low-battery signal), aid insertion/removal, volume adjustment (if applicable), user booklet, warranty information, storage cases, and other hearing aid details.     No charge visit today (in warranty hearing aid check).     ASSESSMENT: A follow-up appointment for hearing aid fitting was completed today. Changes to hearing aid was completed as outlined above.     PLAN:Virginia will return for follow-up as needed, or at least every 9-12 months for cleaning and assessment of hearing aid.  . Please call this clinic with any questions regarding today s appointment.    Sharon Elena M.A.   Audiology Doctoral Student #[id number]    I was present with the patient for the entire Audiology appointment  including all procedures/testing performed by the AuD student, and agree with the student s assessment and plan as documented.      Jemma Pendleton, Greg  Audiologist  [place] License  #[id number]

## 2022-02-24 ENCOUNTER — TELEPHONE (OUTPATIENT)
Dept: AUDIOLOGY | Facility: CLINIC | Age: 87
End: 2022-02-24
Payer: COMMERCIAL

## 2022-02-24 NOTE — TELEPHONE ENCOUNTER
Walk-in hearing aid services on 2/24/22: The patient asked to have her right hearing aid cleaned and checked.  The hearing aid and earmold were cleaned and the earmold tubing was replaced.  The hearing aid was found to be working normally.  It was returned to [place] who reported a comfortable fit and good sound quality.  She was advised to return to the clinic as needed.

## 2022-03-24 ENCOUNTER — OFFICE VISIT (OUTPATIENT)
Dept: AUDIOLOGY | Facility: CLINIC | Age: 87
End: 2022-03-24
Payer: COMMERCIAL

## 2022-03-24 ENCOUNTER — OFFICE VISIT (OUTPATIENT)
Dept: OTOLARYNGOLOGY | Facility: CLINIC | Age: 87
End: 2022-03-24
Payer: COMMERCIAL

## 2022-03-24 VITALS
TEMPERATURE: 97.1 F | WEIGHT: 185 LBS | HEART RATE: 92 BPM | SYSTOLIC BLOOD PRESSURE: 183 MMHG | HEIGHT: 68 IN | DIASTOLIC BLOOD PRESSURE: 97 MMHG | BODY MASS INDEX: 28.04 KG/M2

## 2022-03-24 DIAGNOSIS — H90.6 MIXED HEARING LOSS, BILATERAL: Primary | ICD-10-CM

## 2022-03-24 DIAGNOSIS — H92.11 OTORRHEA, RIGHT: Primary | ICD-10-CM

## 2022-03-24 PROCEDURE — 87102 FUNGUS ISOLATION CULTURE: CPT | Mod: 90 | Performed by: PATHOLOGY

## 2022-03-24 PROCEDURE — 92504 EAR MICROSCOPY EXAMINATION: CPT | Performed by: REGISTERED NURSE

## 2022-03-24 PROCEDURE — 87070 CULTURE OTHR SPECIMN AEROBIC: CPT | Mod: 90 | Performed by: PATHOLOGY

## 2022-03-24 PROCEDURE — 92550 TYMPANOMETRY & REFLEX THRESH: CPT | Mod: 52 | Performed by: AUDIOLOGIST

## 2022-03-24 PROCEDURE — 87077 CULTURE AEROBIC IDENTIFY: CPT | Mod: 90 | Performed by: PATHOLOGY

## 2022-03-24 PROCEDURE — 99000 SPECIMEN HANDLING OFFICE-LAB: CPT | Performed by: PATHOLOGY

## 2022-03-24 PROCEDURE — 87186 SC STD MICRODIL/AGAR DIL: CPT | Mod: 90 | Performed by: PATHOLOGY

## 2022-03-24 PROCEDURE — 92557 COMPREHENSIVE HEARING TEST: CPT | Performed by: AUDIOLOGIST

## 2022-03-24 RX ORDER — CIPROFLOXACIN AND DEXAMETHASONE 3; 1 MG/ML; MG/ML
3-4 SUSPENSION/ DROPS AURICULAR (OTIC) 2 TIMES DAILY
Qty: 7.5 ML | Refills: 0 | Status: SHIPPED | OUTPATIENT
Start: 2022-03-24 | End: 2022-03-24

## 2022-03-24 RX ORDER — CIPROFLOXACIN AND DEXAMETHASONE 3; 1 MG/ML; MG/ML
3-4 SUSPENSION/ DROPS AURICULAR (OTIC) 2 TIMES DAILY
Qty: 7.5 ML | Refills: 0 | Status: SHIPPED | OUTPATIENT
Start: 2022-03-24

## 2022-03-24 ASSESSMENT — PAIN SCALES - GENERAL: PAINLEVEL: NO PAIN (0)

## 2022-03-24 NOTE — NURSING NOTE
"Chief Complaint   Patient presents with     RECHECK     ear infection with WIN prior ([facility] pt)    Blood pressure (!) 183/97, pulse 92, temperature 97.1  F (36.2  C), temperature source Temporal, height 1.727 m (5' 8\"), weight 83.9 kg (185 lb). Jagruti Hector, EMT  "

## 2022-03-24 NOTE — PROGRESS NOTES
Otolaryngology Clinic  March 24, 2022    HPI:  Moira Zaragoza is here for a recheck of their right ear.  Patient has a history of right otitis externa treated with Augmentin and TobraDex drops in May 2021.  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     Patient reports recent difficulty with right hearing. Patient had hearing aid serviced and hearing remained down. Patient was then seen by PCP who prescribed oral antibiotics for otitis media. Patient was found to have a dull, red TM with purulent drainage. Patient completed antibiotics without a change in hearing.     Today, patient denies any pain, drainage, or dizziness.      Otologic microscope exam:    Patient's ear pathology required use of the binocular microscope for the purpose of cleaning and improving visualization in order to assure a more accurate diagnostic evaluation.    Right ear was examined under the microscope.  Yellow, purulent fluid in ear canal.  Culture taken today.  Drainage cleaned using suction.  TM is red and thickened with small amount of granulation tissue in superior posterior canal that bled with suction.    Left ear was also examined under the microscope. Normal appearing TM, nicely aerated middle ear space.     Assessment and Plan:  1. Otorrhea, right  Patient with acute otitis externa that was cultured today.  Will start Ciprodex drops in the meantime and will contact patient if  medicated drops need to be changed based on culture results.  Instructed patient regarding dry ear precautions.  Patient should refrain from wearing hearing aid as much as possible but understand that hearing aid is needed for communication as this is her only hearing ear.    Will have patient return to clinic in 3 weeks for recheck of right ear.    Grace Michele DNP, APRN, CNP  Otolaryngology  Head & Neck Surgery  [phone]    20 minutes spent on the date of the encounter doing chart review, history and  exam, documentation and further activities per the note excluding time performing ear cleaning under microscopy

## 2022-03-24 NOTE — LETTER
3/24/2022       RE: Moira Zaragoza  [address]  [facility] [address]     Dear Colleague,    Thank you for referring your patient, Moira Zaragoza, to the [facility] EAR NOSE AND THROAT CLINIC [place] at [facility]. Please see a copy of my visit note below.      Otolaryngology Clinic  March 24, 2022    HPI:  Moira Zaragoza is here for a recheck of their right ear.  Patient has a history of right otitis externa treated with Augmentin and TobraDex drops in May 2021.  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     Patient reports recent difficulty with right hearing. Patient had hearing aid serviced and hearing remained down. Patient was then seen by PCP who prescribed oral antibiotics for otitis media. Patient was found to have a dull, red TM with purulent drainage. Patient completed antibiotics without a change in hearing.     Today, patient denies any pain, drainage, or dizziness.      Otologic microscope exam:    Patient's ear pathology required use of the binocular microscope for the purpose of cleaning and improving visualization in order to assure a more accurate diagnostic evaluation.    Right ear was examined under the microscope.  Yellow, purulent fluid in ear canal.  Culture taken today.  Drainage cleaned using suction.  TM is red and thickened with small amount of granulation tissue in superior posterior canal that bled with suction.    Left ear was also examined under the microscope. Normal appearing TM, nicely aerated middle ear space.     Assessment and Plan:  1. Otorrhea, right  Patient with acute otitis externa that was cultured today.  Will start Ciprodex drops in the meantime and will contact patient if  medicated drops need to be changed based on culture results.  Instructed patient regarding dry ear precautions.  Patient should refrain from wearing hearing aid as much as possible  but understand that hearing aid is needed for communication as this is her only hearing ear.    Will have patient return to clinic in 3 weeks for recheck of right ear.    Grace Michele DNP, APRN, CNP  Otolaryngology  Head & Neck Surgery  [phone]    20 minutes spent on the date of the encounter doing chart review, history and exam, documentation and further activities per the note excluding time performing ear cleaning under microscopy

## 2022-03-24 NOTE — PROGRESS NOTES
AUDIOLOGY REPORT    SUMMARY: Audiology visit completed. See audiogram for results.      RECOMMENDATIONS: Follow-up with ENT.    Jamaal Goyal. CCC-A  Licensed Audiologist   [place] #[id number]

## 2022-03-24 NOTE — PATIENT INSTRUCTIONS
1. You were seen in the ENT Clinic today by Grace Michele NP.  If you have any questions or concerns after your appointment, please call   - Option 1: ENT Clinic: [phone]   - Option 2: [facility] (Grace Michele, JANI's  Nurse): [phone]                     2.   Plan to return to clinic in 3 weeks    3. Ciprodex Right ear- 3 drops twice daily x 14 days    4. Right ear culture- will call with results    Laurence Griggs LPN  [facility] - Otolaryngology

## 2022-03-28 LAB
BACTERIA SPEC CULT: ABNORMAL
BACTERIA SPEC CULT: ABNORMAL

## 2022-04-08 ENCOUNTER — TELEPHONE (OUTPATIENT)
Dept: AUDIOLOGY | Facility: CLINIC | Age: 87
End: 2022-04-08
Payer: COMMERCIAL

## 2022-04-08 NOTE — TELEPHONE ENCOUNTER
[facility] Call Center    Phone Message    May a detailed message be left on voicemail: yes     Reason for Call: Other: Pt calling about her hearing aid she left with Pedro to repair 3/24/22 Please call pt back to discuss    Thank you,    Action Taken: Message routed to:  [facility] ([facility] Audiology    Travel Screening: Not Applicable

## 2022-04-13 ENCOUNTER — OFFICE VISIT (OUTPATIENT)
Dept: OTOLARYNGOLOGY | Facility: CLINIC | Age: 87
End: 2022-04-13
Payer: COMMERCIAL

## 2022-04-13 VITALS
TEMPERATURE: 96 F | WEIGHT: 187 LBS | BODY MASS INDEX: 31.16 KG/M2 | HEIGHT: 65 IN | DIASTOLIC BLOOD PRESSURE: 82 MMHG | SYSTOLIC BLOOD PRESSURE: 161 MMHG

## 2022-04-13 DIAGNOSIS — H92.11 OTORRHEA, RIGHT: Primary | ICD-10-CM

## 2022-04-13 PROCEDURE — 92504 EAR MICROSCOPY EXAMINATION: CPT | Performed by: REGISTERED NURSE

## 2022-04-13 ASSESSMENT — PAIN SCALES - GENERAL: PAINLEVEL: NO PAIN (0)

## 2022-04-13 NOTE — PATIENT INSTRUCTIONS
- You were seen in the ENT Clinic today by Grace Michele NP.   - The right ear has no sign of infection. Continue dry ear precautions to right ear. You may use a hair dryer on low to dry ear as needed.   - Call [facility] at [phone] for any questions, concerns, or changes in hearing.  - Please send a [employer] message or call the ENT clinic at [phone] with any questions or concerns.

## 2022-04-13 NOTE — LETTER
4/13/2022       RE: Moira Zaragoza  [address]  [facility] [address]     Dear Colleague,    Thank you for referring your patient, Moira Zaragoza, to the [facility] EAR NOSE AND THROAT CLINIC [place] at [facility]. Please see a copy of my visit note below.      Otolaryngology Clinic  April 13, 2022    HPI:  Moira Zaragoza is here for a recheck of their right ear after treatment for right otitis externa. Last seen in clinic 3/24/22 for assessment of right ear due to persistent right hearing aid plugging. Patient was asymptomatic at that time, denying otorrhea, otalgia or change in right hearing. Patient found to have significant purulent drainage in right ear. Culture taken demonstrating pseudomonas. Treated with ciprodex drops with instructions to follow dry ear precautions.    Today, patient returns to clinic for recheck of her right ear.  Patient reports she is doing well and remains asymptomatic.  She is hopeful that the infection has resolved so she can begin to wear her new hearing aid on the right side.    Patient denies any otalgia, otorrhea, dizziness, tinnitus, hearing loss, facial numbness/weakness, history of frequent ear infections, or ear surgeries.     Otologic microscope exam:    Patient's ear pathology required use of the binocular microscope for the purpose of cleaning and improving visualization in order to assure a more accurate diagnostic evaluation.    Right ear was examined under the microscope.  Ear canal is clean and dry without drainage.  There is no moisture visualized with in the ear canal or on the tympanic membrane.  TM visualized under microscope. Normal appearing TM, nicely aerated middle ear space.      Assessment and Plan:  1. Otorrhea, right  Patient with a history of a right Pseudomonas otitis externa that has been successfully treated with Ciprodex drops.  Infection appears to have resolved and ear canal  is clean and dry on today's exam.  Patient can begin to wear new right-sided hearing aid.  Recommend patient continue to monitor for symptoms and return to clinic with any new ear drainage, pain, or change in hearing.    Reviewed with patient that she does not have to follow formal dry ear precautions at this time but should be sure to keep the right ear as dry as possible due to history of moisture in ear.     Patient will follow up as needed.    Grace Michele DNP, APRN, CNP  Otolaryngology  Head & Neck Surgery  [phone]    20 minutes spent on the date of the encounter doing chart review, history and exam, documentation and further activities per the note      Again, thank you for allowing me to participate in the care of your patient.      Sincerely,    Lanette Michele, NP

## 2022-04-13 NOTE — NURSING NOTE
"Chief Complaint   Patient presents with     RECHECK     Follow up       Blood pressure (!) 161/82, temperature (!) 96  F (35.6  C), temperature source Temporal, height 1.651 m (5' 5\"), weight 84.8 kg (187 lb).    Malika Schofield, EMT    "

## 2022-04-13 NOTE — PROGRESS NOTES
Otolaryngology Clinic  April 13, 2022    HPI:  Moira Zaragoza is here for a recheck of their right ear after treatment for right otitis externa. Last seen in clinic 3/24/22 for assessment of right ear due to persistent right hearing aid plugging. Patient was asymptomatic at that time, denying otorrhea, otalgia or change in right hearing. Patient found to have significant purulent drainage in right ear. Culture taken demonstrating pseudomonas. Treated with ciprodex drops with instructions to follow dry ear precautions.    Today, patient returns to clinic for recheck of her right ear.  Patient reports she is doing well and remains asymptomatic.  She is hopeful that the infection has resolved so she can begin to wear her new hearing aid on the right side.    Patient denies any otalgia, otorrhea, dizziness, tinnitus, hearing loss, facial numbness/weakness, history of frequent ear infections, or ear surgeries.     Otologic microscope exam:    Patient's ear pathology required use of the binocular microscope for the purpose of cleaning and improving visualization in order to assure a more accurate diagnostic evaluation.    Right ear was examined under the microscope.  Ear canal is clean and dry without drainage.  There is no moisture visualized with in the ear canal or on the tympanic membrane.  TM visualized under microscope. Normal appearing TM, nicely aerated middle ear space.      Assessment and Plan:  1. Otorrhea, right  Patient with a history of a right Pseudomonas otitis externa that has been successfully treated with Ciprodex drops.  Infection appears to have resolved and ear canal is clean and dry on today's exam.  Patient can begin to wear new right-sided hearing aid.  Recommend patient continue to monitor for symptoms and return to clinic with any new ear drainage, pain, or change in hearing.    Reviewed with patient that she does not have to follow formal dry ear precautions at this time but should be  sure to keep the right ear as dry as possible due to history of moisture in ear.     Patient will follow up as needed.    Grace Michele DNP, APRN, CNP  Otolaryngology  Head & Neck Surgery  [phone]    20 minutes spent on the date of the encounter doing chart review, history and exam, documentation and further activities per the note

## 2022-04-21 LAB — BACTERIA SPEC CULT: NO GROWTH

## 2022-06-18 ENCOUNTER — HEALTH MAINTENANCE LETTER (OUTPATIENT)
Age: 87
End: 2022-06-18

## 2022-06-30 ENCOUNTER — OFFICE VISIT (OUTPATIENT)
Dept: OTOLARYNGOLOGY | Facility: CLINIC | Age: 87
End: 2022-06-30
Payer: COMMERCIAL

## 2022-06-30 VITALS
BODY MASS INDEX: 30.99 KG/M2 | SYSTOLIC BLOOD PRESSURE: 175 MMHG | HEART RATE: 98 BPM | HEIGHT: 65 IN | DIASTOLIC BLOOD PRESSURE: 83 MMHG | WEIGHT: 186 LBS | TEMPERATURE: 97.7 F

## 2022-06-30 DIAGNOSIS — H92.11 OTORRHEA, RIGHT: Primary | ICD-10-CM

## 2022-06-30 DIAGNOSIS — H90.3 SENSORINEURAL HEARING LOSS (SNHL) OF BOTH EARS: ICD-10-CM

## 2022-06-30 PROCEDURE — 87106 FUNGI IDENTIFICATION YEAST: CPT | Mod: 90 | Performed by: PATHOLOGY

## 2022-06-30 PROCEDURE — 87102 FUNGUS ISOLATION CULTURE: CPT | Mod: 90 | Performed by: PATHOLOGY

## 2022-06-30 PROCEDURE — 87070 CULTURE OTHR SPECIMN AEROBIC: CPT | Mod: 90 | Performed by: PATHOLOGY

## 2022-06-30 PROCEDURE — 92504 EAR MICROSCOPY EXAMINATION: CPT | Performed by: REGISTERED NURSE

## 2022-06-30 PROCEDURE — 87107 FUNGI IDENTIFICATION MOLD: CPT | Mod: 90 | Performed by: PATHOLOGY

## 2022-06-30 PROCEDURE — 99000 SPECIMEN HANDLING OFFICE-LAB: CPT | Performed by: PATHOLOGY

## 2022-06-30 PROCEDURE — 99213 OFFICE O/P EST LOW 20 MIN: CPT | Mod: 25 | Performed by: REGISTERED NURSE

## 2022-06-30 RX ORDER — CIPROFLOXACIN AND DEXAMETHASONE 3; 1 MG/ML; MG/ML
4 SUSPENSION/ DROPS AURICULAR (OTIC) 2 TIMES DAILY
Qty: 7.5 ML | Refills: 0 | Status: SHIPPED | OUTPATIENT
Start: 2022-06-30 | End: 2022-07-10

## 2022-06-30 ASSESSMENT — PAIN SCALES - GENERAL: PAINLEVEL: NO PAIN (0)

## 2022-06-30 NOTE — PROGRESS NOTES
Otolaryngology Clinic  June 30, 2022    HPI:  Moira Zaragoza is here for a recheck of their right ear after treatment for right otitis externa in March. Patient has a long-standing history of persistent otorrhea of the right ear which is her only hearing ear. Wears hearing aid on right side. Last seen in clinic 4/13/22 for follow up after ciprodex treatment. Right ear otorrhea had resolved and ear was clean and dry.      Today, patient returns to clinic for recheck of her right ear.  Patient originally scheduled this appointment for a recheck prior to leaving for [place] for 2 months. Patient has been asymptomatic without pain, drainage or change in hearing. Patient has been wearing new right-sided hearing aid. Patient reports that her hearing aid stopped working yesterday. Concerned this is due to drainage again. She is currently wearing her old hearing aid which is working well.     Patient denies any otalgia, otorrhea, or dizziness.    Otologic microscope exam:    Patient's ear pathology required use of the binocular microscope for the purpose of cleaning and improving visualization in order to assure a more accurate diagnostic evaluation.    Right ear was examined under the microscope. Purulent, mucoid drainage in inferior canal. This was cultured and then cleaned with suction. TM is thickened and erythematous. Unable to visualize middle ear space. Crusting in inferior canal cleaned with suction and alligator forceps.     Left ear was also examined under the microscope. Ear canal is clean and dry. TM intact with well aerated middle ear space.     Assessment and Plan:  1. Otorrhea, right  Patient with recurrent right otorrhea. Presumably recurrent pseudomonas infection but culture taken to confirm since patient is leaving out of [facility] for 2 months. Will have patient start ciprodex drops. Will call patient if treatment regimen changes based on culture results. Encouraged patient to refrain from hearing  aid use in right ear as much as possible. It is ok to wear aid when needed due to this being her only hearing ear.     Patient will follow up for repeat ear check after she returns to [place].    Grace Michele DNP, APRN, CNP  Otolaryngology  Head & Neck Surgery  [phone]    20 minutes spent on the date of the encounter doing chart review, history and exam, documentation and further activities per the note excluding time spent cleaning ear under microscopy.

## 2022-06-30 NOTE — PATIENT INSTRUCTIONS
- You were seen in the ENT Clinic today by Grace Michele NP.   - Use ear drops to the right ear as prescribed.  - Follow up after you return from [place] for an ear check.  - Please send a [employer] message or call the ENT clinic at [phone] with any questions or concerns.

## 2022-06-30 NOTE — LETTER
6/30/2022       RE: Moira Zaragoza  [address]  [facility] [address]     Dear Colleague,    Thank you for referring your patient, Moira Zaragoza, to the [facility] EAR NOSE AND THROAT CLINIC [place] at [facility]. Please see a copy of my visit note below.      Otolaryngology Clinic  June 30, 2022    HPI:  Moira Zaragoza is here for a recheck of their right ear after treatment for right otitis externa in March. Patient has a long-standing history of persistent otorrhea of the right ear which is her only hearing ear. Wears hearing aid on right side. Last seen in clinic 4/13/22 for follow up after ciprodex treatment. Right ear otorrhea had resolved and ear was clean and dry.      Today, patient returns to clinic for recheck of her right ear.  Patient originally scheduled this appointment for a recheck prior to leaving for [place] for 2 months. Patient has been asymptomatic without pain, drainage or change in hearing. Patient has been wearing new right-sided hearing aid. Patient reports that her hearing aid stopped working yesterday. Concerned this is due to drainage again. She is currently wearing her old hearing aid which is working well.     Patient denies any otalgia, otorrhea, or dizziness.    Otologic microscope exam:    Patient's ear pathology required use of the binocular microscope for the purpose of cleaning and improving visualization in order to assure a more accurate diagnostic evaluation.    Right ear was examined under the microscope. Purulent, mucoid drainage in inferior canal. This was cultured and then cleaned with suction. TM is thickened and erythematous. Unable to visualize middle ear space. Crusting in inferior canal cleaned with suction and alligator forceps.     Left ear was also examined under the microscope. Ear canal is clean and dry. TM intact with well aerated middle ear space.     Assessment and Plan:  1.  Otorrhea, right  Patient with recurrent right otorrhea. Presumably recurrent pseudomonas infection but culture taken to confirm since [place] of [place]. Will have patient start ciprodex drops. Will call patient if treatment regimen changes based on culture results. Encouraged patient to refrain from hearing aid use in right ear as much as possible. It is ok to wear aid when needed due to this being her only hearing ear.     Patient will follow up for repeat ear check after she returns to [place].    Grace Michele DNP, APRN, CNP  Otolaryngology  Head & Neck Surgery  [phone]    20 minutes spent on the date of the encounter doing chart review, history and exam, documentation and further activities per the note excluding time spent cleaning ear under microscopy.      Again, thank you for allowing me to participate in the care of your patient.      Sincerely,    Lanette Michele, NP

## 2022-07-02 LAB — BACTERIA SPEC CULT: NORMAL

## 2022-07-06 DIAGNOSIS — B36.9 OTITIS EXTERNA, FUNGAL, RIGHT EAR: Primary | ICD-10-CM

## 2022-07-06 DIAGNOSIS — H62.41 OTITIS EXTERNA, FUNGAL, RIGHT EAR: Primary | ICD-10-CM

## 2022-07-06 RX ORDER — CLOTRIMAZOLE 1 G/ML
SOLUTION TOPICAL
Qty: 10 ML | Refills: 1 | Status: SHIPPED | OUTPATIENT
Start: 2022-07-06 | End: 2022-07-20

## 2022-07-28 LAB
BACTERIA SPEC CULT: ABNORMAL
BACTERIA SPEC CULT: ABNORMAL

## 2022-10-12 ENCOUNTER — TELEPHONE (OUTPATIENT)
Dept: AUDIOLOGY | Facility: CLINIC | Age: 87
End: 2022-10-12

## 2022-10-13 NOTE — TELEPHONE ENCOUNTER
Walk-in hearing aid services on 10/12/22: The patient asked to have her current and back-up hearing aids cleaned and checked.  Both hearing aids and earmolds were deep cleaned.  The earmold tubing was replaced on both earmolds and the tonehook was replaced on the current hearing aid.  Both hearing aids were found to be working properly and were returned to the patient.

## 2022-10-30 ENCOUNTER — HEALTH MAINTENANCE LETTER (OUTPATIENT)
Age: 87
End: 2022-10-30

## 2022-12-17 ENCOUNTER — HEALTH MAINTENANCE LETTER (OUTPATIENT)
Age: 87
End: 2022-12-17

## 2023-06-09 ENCOUNTER — OFFICE VISIT (OUTPATIENT)
Dept: AUDIOLOGY | Facility: CLINIC | Age: 88
End: 2023-06-09
Payer: COMMERCIAL

## 2023-06-09 DIAGNOSIS — H90.6 MIXED HEARING LOSS, BILATERAL: Primary | ICD-10-CM

## 2023-06-12 NOTE — PROGRESS NOTES
AUDIOLOGY REPORT    BACKGROUND INFORMATION: Moira Zaragoza was seen in Audiology at the [facility] on 6/9/2023 for a hearing aid check.   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 The patient has been seen previously in this clinic and was fit with a right Phonak Mary P50-UP on 6/28/2021    TEST RESULTS AND PROCEDURES: The patient reports that she had requested her friend schedule an appointment for follow-up with Ear, Nose and Throat but had instead been scheduled for a hearing aid check. The patient's hearing aid was cleaned and assessed while in office today and the patient reports good volume and sound quality with the device after the cleaning; no adjustments preferred today.    SUMMARY AND RECOMMENDATIONS: A hearing aid check was performed today.  Adjustments were made as noted above and the patient will return as needed or at least every 4-6 months for cleaning and assessment of hearing aid.  Please call this clinic with questions regarding today s visit.      Jamaal Mcguire.  Licensed Audiologist  [place] #[id number]

## 2023-06-29 ENCOUNTER — OFFICE VISIT (OUTPATIENT)
Dept: OTOLARYNGOLOGY | Facility: CLINIC | Age: 88
End: 2023-06-29
Payer: COMMERCIAL

## 2023-06-29 VITALS
BODY MASS INDEX: 31.49 KG/M2 | DIASTOLIC BLOOD PRESSURE: 81 MMHG | HEART RATE: 79 BPM | SYSTOLIC BLOOD PRESSURE: 158 MMHG | WEIGHT: 189 LBS | OXYGEN SATURATION: 96 % | HEIGHT: 65 IN

## 2023-06-29 DIAGNOSIS — H92.11 OTORRHEA, RIGHT: Primary | ICD-10-CM

## 2023-06-29 PROCEDURE — 99000 SPECIMEN HANDLING OFFICE-LAB: CPT | Performed by: PATHOLOGY

## 2023-06-29 PROCEDURE — 92504 EAR MICROSCOPY EXAMINATION: CPT | Performed by: REGISTERED NURSE

## 2023-06-29 PROCEDURE — 87102 FUNGUS ISOLATION CULTURE: CPT | Performed by: REGISTERED NURSE

## 2023-06-29 PROCEDURE — 99213 OFFICE O/P EST LOW 20 MIN: CPT | Mod: 25 | Performed by: REGISTERED NURSE

## 2023-06-29 PROCEDURE — 87077 CULTURE AEROBIC IDENTIFY: CPT | Performed by: REGISTERED NURSE

## 2023-06-29 ASSESSMENT — PAIN SCALES - GENERAL: PAINLEVEL: NO PAIN (0)

## 2023-06-29 NOTE — LETTER
6/29/2023       RE: Moira Zaragoza  [address]  [facility] [address]     Dear Colleague,    Thank you for referring your patient, Moira Zaragoza, to the [facility] EAR NOSE AND THROAT CLINIC [place] at [facility]. Please see a copy of my visit note below.      Otolaryngology Clinic  June 29, 2023    HPI:  Moira Zaragoza is here for a recheck and cleaning of their right ear. Patient has a history of right otorrhea. Wears hearing aids bilaterally. Last seen in clinic 6/30/22 after treatment with ciprodex drops, further treated with clotrimazole drops for fungal otitis externa.     Today, patient states that she is doing well. Denies any otalgia, otorrhea, or change in hearing.     Otologic microscope exam:    Patient's ear pathology required use of the binocular microscope for the purpose of cleaning and improving visualization in order to assure a more accurate diagnostic evaluation.    Right ear was examined under the microscope. Purulent crusting in superior canal against TM. It was cleaned with right angle hook and suction. Culture taken. Once cleaned, TM visualized under microscope. TM is thickened but intact.     Left ear was also examined under the microscope. Ear canal is clean and dry. TM visualized under microscope. Normal appearing TM, nicely aerated middle ear space.     Assessment and Plan:  1. Otorrhea, right  The patient presents for recheck of right ear due to history of right otorrhea. Patient does have some crusting with purulent drainage under crusting once removed. Culture taken today. Will contact patient with results and recommendations once available.     Patient will follow up annually and as needed for any new ear pain, drainage, or change in hearing.    Grace Michele DNP, APRN, CNP  Otolaryngology  Head & Neck Surgery  [phone]    20 minutes spent on the date of the encounter doing chart review, history and  exam, documentation and further activities per the note excluding time spent examining and cleaning ear under microscopy.      Again, thank you for allowing me to participate in the care of your patient.      Sincerely,    Lanette Michele NP

## 2023-07-03 LAB
BACTERIA SPEC CULT: ABNORMAL

## 2023-07-04 DIAGNOSIS — H92.11 OTORRHEA, RIGHT: Primary | ICD-10-CM

## 2023-07-05 ENCOUNTER — TELEPHONE (OUTPATIENT)
Dept: OTOLARYNGOLOGY | Facility: CLINIC | Age: 88
End: 2023-07-05
Payer: COMMERCIAL

## 2023-07-05 RX ORDER — SULFACETAMIDE SODIUM AND PREDNISOLONE SODIUM PHOSPHATE 100; 2.3 MG/ML; MG/ML
1-2 SOLUTION/ DROPS OPHTHALMIC 2 TIMES DAILY
Qty: 5 ML | Refills: 0 | Status: SHIPPED | OUTPATIENT
Start: 2023-07-05 | End: 2023-07-19

## 2023-07-05 NOTE — TELEPHONE ENCOUNTER
Pt's daughter would like her prescription sent to the [facility] in [facility]. Will let Grace J know.    Laurence Griggs LPN

## 2023-07-06 NOTE — PROGRESS NOTES
Otolaryngology Clinic  June 29, 2023    HPI:  Moira Zaragoza is here for a recheck and cleaning of their right ear. Patient has a history of right otorrhea. Wears hearing aids bilaterally. Last seen in clinic 6/30/22 after treatment with ciprodex drops, further treated with clotrimazole drops for fungal otitis externa.     Today, patient states that she is doing well. Denies any otalgia, otorrhea, or change in hearing.     Otologic microscope exam:    Patient's ear pathology required use of the binocular microscope for the purpose of cleaning and improving visualization in order to assure a more accurate diagnostic evaluation.    Right ear was examined under the microscope. Purulent crusting in superior canal against TM. It was cleaned with right angle hook and suction. Culture taken. Once cleaned, TM visualized under microscope. TM is thickened but intact.     Left ear was also examined under the microscope. Ear canal is clean and dry. TM visualized under microscope. Normal appearing TM, nicely aerated middle ear space.     Assessment and Plan:  1. Otorrhea, right  The patient presents for recheck of right ear due to history of right otorrhea. Patient does have some crusting with purulent drainage under crusting once removed. Culture taken today. Will contact patient with results and recommendations once available.     Patient will follow up annually and as needed for any new ear pain, drainage, or change in hearing.    Grace Michele DNP, APRN, CNP  Otolaryngology  Head & Neck Surgery  [phone]    20 minutes spent on the date of the encounter doing chart review, history and exam, documentation and further activities per the note excluding time spent examining and cleaning ear under microscopy.

## 2023-07-27 LAB — BACTERIA SPEC CULT: NO GROWTH

## 2024-01-21 ENCOUNTER — HEALTH MAINTENANCE LETTER (OUTPATIENT)
Age: 89
End: 2024-01-21

## 2025-02-01 ENCOUNTER — HEALTH MAINTENANCE LETTER (OUTPATIENT)
Age: OVER 89
End: 2025-02-01